# Patient Record
Sex: MALE
[De-identification: names, ages, dates, MRNs, and addresses within clinical notes are randomized per-mention and may not be internally consistent; named-entity substitution may affect disease eponyms.]

---

## 2020-09-22 ENCOUNTER — NON-APPOINTMENT (OUTPATIENT)
Age: 45
End: 2020-09-22

## 2020-09-22 ENCOUNTER — APPOINTMENT (OUTPATIENT)
Dept: CARDIOLOGY | Facility: CLINIC | Age: 45
End: 2020-09-22
Payer: COMMERCIAL

## 2020-09-22 VITALS
TEMPERATURE: 99.6 F | HEIGHT: 70 IN | WEIGHT: 173 LBS | OXYGEN SATURATION: 98 % | SYSTOLIC BLOOD PRESSURE: 138 MMHG | HEART RATE: 68 BPM | DIASTOLIC BLOOD PRESSURE: 84 MMHG | BODY MASS INDEX: 24.77 KG/M2

## 2020-09-22 DIAGNOSIS — Z78.9 OTHER SPECIFIED HEALTH STATUS: ICD-10-CM

## 2020-09-22 DIAGNOSIS — Z82.62 FAMILY HISTORY OF OSTEOPOROSIS: ICD-10-CM

## 2020-09-22 DIAGNOSIS — F43.9 REACTION TO SEVERE STRESS, UNSPECIFIED: ICD-10-CM

## 2020-09-22 DIAGNOSIS — Z82.49 FAMILY HISTORY OF ISCHEMIC HEART DISEASE AND OTHER DISEASES OF THE CIRCULATORY SYSTEM: ICD-10-CM

## 2020-09-22 DIAGNOSIS — Z87.891 PERSONAL HISTORY OF NICOTINE DEPENDENCE: ICD-10-CM

## 2020-09-22 DIAGNOSIS — F10.21 ALCOHOL DEPENDENCE, IN REMISSION: ICD-10-CM

## 2020-09-22 DIAGNOSIS — Z80.7 FAMILY HISTORY OF OTHER MALIGNANT NEOPLASMS OF LYMPHOID, HEMATOPOIETIC AND RELATED TISSUES: ICD-10-CM

## 2020-09-22 PROCEDURE — 99203 OFFICE O/P NEW LOW 30 MIN: CPT | Mod: 25

## 2020-09-22 PROCEDURE — 93000 ELECTROCARDIOGRAM COMPLETE: CPT

## 2020-09-22 NOTE — DISCUSSION/SUMMARY
[FreeTextEntry1] : 45 year old male former smoker and EtOH abuse presents for a cardiac evaluation. He has family history of early CAD in paternal uncle and grandfather (father passed away young of bone cancer). Patient exercises most days of the week. Has no exertional complaints, so echocardiogram and ETT would be low yield. I am recommending CT of the coronary for calcium scoring to evaluate for subclinical CAD which would warrant at least starting statin therapy. Will also order basic labs for patient as he hasn't seen a physician in over 13 years.\par \par Follow up in 6 weeks.

## 2020-09-22 NOTE — HISTORY OF PRESENT ILLNESS
[FreeTextEntry1] : 45 year old male with history of EtOH abuse (sober for 4 years), former smoker, no illicit drug use presents for a cardiac evaluation. Patient has family history of early CAD on his father's side (paternal uncle, grandfather). Father passed away from bone cancer at a young age. Patient very active. He runs on his treadmill 2 miles most days of the week. He has no exertional chest pain, SOB, or palpitations. He has 5 children at home and states he has a lot of responsibility so he wants to make sure his heart is good. \par \par He denies a previous history of MI, CVA, CHF, or previous coronary intervention.

## 2020-11-11 ENCOUNTER — APPOINTMENT (OUTPATIENT)
Dept: CARDIOLOGY | Facility: CLINIC | Age: 45
End: 2020-11-11
Payer: COMMERCIAL

## 2020-11-11 VITALS
BODY MASS INDEX: 24.62 KG/M2 | OXYGEN SATURATION: 97 % | DIASTOLIC BLOOD PRESSURE: 70 MMHG | SYSTOLIC BLOOD PRESSURE: 116 MMHG | HEART RATE: 53 BPM | WEIGHT: 172 LBS | TEMPERATURE: 98 F | HEIGHT: 70 IN

## 2020-11-11 PROCEDURE — 99072 ADDL SUPL MATRL&STAF TM PHE: CPT

## 2020-11-11 PROCEDURE — 99213 OFFICE O/P EST LOW 20 MIN: CPT

## 2020-11-11 NOTE — PHYSICAL EXAM
[General Appearance - Well Developed] : well developed [Normal Appearance] : normal appearance [Well Groomed] : well groomed [General Appearance - Well Nourished] : well nourished [No Deformities] : no deformities [General Appearance - In No Acute Distress] : no acute distress [Normal Conjunctiva] : the conjunctiva exhibited no abnormalities [Eyelids - No Xanthelasma] : the eyelids demonstrated no xanthelasmas [Normal Oral Mucosa] : normal oral mucosa [No Oral Pallor] : no oral pallor [No Oral Cyanosis] : no oral cyanosis [Respiration, Rhythm And Depth] : normal respiratory rhythm and effort [Exaggerated Use Of Accessory Muscles For Inspiration] : no accessory muscle use [Auscultation Breath Sounds / Voice Sounds] : lungs were clear to auscultation bilaterally [Heart Rate And Rhythm] : heart rate and rhythm were normal [Heart Sounds] : normal S1 and S2 [Murmurs] : no murmurs present [Edema] : no peripheral edema present [Abnormal Walk] : normal gait [Gait - Sufficient For Exercise Testing] : the gait was sufficient for exercise testing [Nail Clubbing] : no clubbing of the fingernails [Cyanosis, Localized] : no localized cyanosis [Petechial Hemorrhages (___cm)] : no petechial hemorrhages [Skin Color & Pigmentation] : normal skin color and pigmentation [] : no rash [No Venous Stasis] : no venous stasis [Skin Lesions] : no skin lesions [No Skin Ulcers] : no skin ulcer [No Xanthoma] : no  xanthoma was observed [Oriented To Time, Place, And Person] : oriented to person, place, and time [Affect] : the affect was normal [Mood] : the mood was normal [No Anxiety] : not feeling anxious [FreeTextEntry1] : No JVD, no carotid artery bruits auscultated bilaterally

## 2020-11-11 NOTE — DISCUSSION/SUMMARY
[FreeTextEntry1] : 45 year old male former smoker and EtOH abuse presents for a cardiac evaluation. He has family history of early CAD in paternal uncle and grandfather (father passed away young of bone cancer). Patient exercises most days of the week. Has no exertional complaints, so echocardiogram and ETT would be low yield. I recommended CT of the coronary for calcium scoring. It was performed on 10/16/2020 and discussed the results with the patient. His calcium score is 0. Recommend continued healthy lifestyle with daily aerobic exercise and continued smoking cessation. \par \par Follow up in 1 year.

## 2020-11-12 ENCOUNTER — TRANSCRIPTION ENCOUNTER (OUTPATIENT)
Age: 45
End: 2020-11-12

## 2021-11-11 ENCOUNTER — APPOINTMENT (OUTPATIENT)
Dept: CARDIOLOGY | Facility: CLINIC | Age: 46
End: 2021-11-11
Payer: COMMERCIAL

## 2021-11-11 ENCOUNTER — NON-APPOINTMENT (OUTPATIENT)
Age: 46
End: 2021-11-11

## 2021-11-11 VITALS
HEIGHT: 70 IN | WEIGHT: 168 LBS | SYSTOLIC BLOOD PRESSURE: 130 MMHG | HEART RATE: 60 BPM | BODY MASS INDEX: 24.05 KG/M2 | DIASTOLIC BLOOD PRESSURE: 84 MMHG | OXYGEN SATURATION: 100 %

## 2021-11-11 DIAGNOSIS — Z13.6 ENCOUNTER FOR SCREENING FOR CARDIOVASCULAR DISORDERS: ICD-10-CM

## 2021-11-11 PROCEDURE — 99213 OFFICE O/P EST LOW 20 MIN: CPT | Mod: 25

## 2021-11-11 PROCEDURE — 93000 ELECTROCARDIOGRAM COMPLETE: CPT

## 2021-11-11 NOTE — DISCUSSION/SUMMARY
[FreeTextEntry1] : 45 year old male former smoker and EtOH abuse presents for a cardiac evaluation. He has family history of early CAD in paternal uncle and grandfather (father passed away young of bone cancer). Patient exercises most days of the week. Has no exertional complaints, so echocardiogram and ETT would be low yield. I recommended CT of the coronary for calcium scoring. It was performed on 10/16/2020 and discussed the results with the patient. His calcium score is 0. Recommend continued healthy lifestyle with daily aerobic exercise and continued smoking cessation. \par \par Follow up on a PRN basis.

## 2021-11-11 NOTE — CARDIOLOGY SUMMARY
[de-identified] : 11/11/2021, NSR, normal ECG [de-identified] : 10/16/2021, CT of the heart for calcium scoring: Total Calcium score of 0

## 2021-11-11 NOTE — PHYSICAL EXAM
[Normal] : moves all extremities, no focal deficits, normal speech [de-identified] : No carotid bruits auscultated bilaterally